# Patient Record
Sex: MALE | HISPANIC OR LATINO | URBAN - METROPOLITAN AREA
[De-identification: names, ages, dates, MRNs, and addresses within clinical notes are randomized per-mention and may not be internally consistent; named-entity substitution may affect disease eponyms.]

---

## 2022-09-21 ENCOUNTER — LAB (OUTPATIENT)
Dept: LAB | Facility: HOSPITAL | Age: 42
End: 2022-09-21

## 2022-09-21 ENCOUNTER — OFFICE VISIT (OUTPATIENT)
Dept: GASTROENTEROLOGY | Facility: CLINIC | Age: 42
End: 2022-09-21

## 2022-09-21 VITALS
OXYGEN SATURATION: 97 % | BODY MASS INDEX: 28.99 KG/M2 | DIASTOLIC BLOOD PRESSURE: 70 MMHG | WEIGHT: 169.8 LBS | HEIGHT: 64 IN | SYSTOLIC BLOOD PRESSURE: 114 MMHG | HEART RATE: 83 BPM | TEMPERATURE: 97.2 F

## 2022-09-21 DIAGNOSIS — R74.01 ELEVATED ALT MEASUREMENT: ICD-10-CM

## 2022-09-21 DIAGNOSIS — R74.01 ELEVATED ALT MEASUREMENT: Primary | ICD-10-CM

## 2022-09-21 DIAGNOSIS — E66.3 OVERWEIGHT (BMI 25.0-29.9): ICD-10-CM

## 2022-09-21 LAB
ALBUMIN SERPL-MCNC: 4.8 G/DL (ref 3.5–5.2)
ALBUMIN/GLOB SERPL: 1.6 G/DL
ALP SERPL-CCNC: 71 U/L (ref 39–117)
ALT SERPL W P-5'-P-CCNC: 84 U/L (ref 1–41)
ANION GAP SERPL CALCULATED.3IONS-SCNC: 10.1 MMOL/L (ref 5–15)
AST SERPL-CCNC: 32 U/L (ref 1–40)
BILIRUB SERPL-MCNC: 0.8 MG/DL (ref 0–1.2)
BUN SERPL-MCNC: 17 MG/DL (ref 6–20)
BUN/CREAT SERPL: 20.5 (ref 7–25)
CALCIUM SPEC-SCNC: 9.5 MG/DL (ref 8.6–10.5)
CERULOPLASMIN SERPL-MCNC: 22 MG/DL (ref 16–31)
CHLORIDE SERPL-SCNC: 105 MMOL/L (ref 98–107)
CO2 SERPL-SCNC: 25.9 MMOL/L (ref 22–29)
CREAT SERPL-MCNC: 0.83 MG/DL (ref 0.76–1.27)
DEPRECATED RDW RBC AUTO: 38.9 FL (ref 37–54)
EGFRCR SERPLBLD CKD-EPI 2021: 112.8 ML/MIN/1.73
ERYTHROCYTE [DISTWIDTH] IN BLOOD BY AUTOMATED COUNT: 12.6 % (ref 12.3–15.4)
FERRITIN SERPL-MCNC: 285 NG/ML (ref 30–400)
GLOBULIN UR ELPH-MCNC: 3 GM/DL
GLUCOSE SERPL-MCNC: 101 MG/DL (ref 65–99)
HBV SURFACE AB SER RIA-ACNC: REACTIVE
HBV SURFACE AG SERPL QL IA: NORMAL
HCT VFR BLD AUTO: 48.4 % (ref 37.5–51)
HGB BLD-MCNC: 17 G/DL (ref 13–17.7)
IGA1 MFR SER: 231 MG/DL (ref 70–400)
IGG1 SER-MCNC: 1241 MG/DL (ref 700–1600)
IGM SERPL-MCNC: 91 MG/DL (ref 40–230)
INR PPP: 1.03 (ref 0.84–1.13)
IRON 24H UR-MRATE: 143 MCG/DL (ref 59–158)
IRON SATN MFR SERPL: 31 % (ref 20–50)
MCH RBC QN AUTO: 29.9 PG (ref 26.6–33)
MCHC RBC AUTO-ENTMCNC: 35.1 G/DL (ref 31.5–35.7)
MCV RBC AUTO: 85.1 FL (ref 79–97)
PLATELET # BLD AUTO: 267 10*3/MM3 (ref 140–450)
PMV BLD AUTO: 10.9 FL (ref 6–12)
POTASSIUM SERPL-SCNC: 4.4 MMOL/L (ref 3.5–5.2)
PROT SERPL-MCNC: 7.8 G/DL (ref 6–8.5)
PROTHROMBIN TIME: 13.4 SECONDS (ref 11.4–14.4)
RBC # BLD AUTO: 5.69 10*6/MM3 (ref 4.14–5.8)
SODIUM SERPL-SCNC: 141 MMOL/L (ref 136–145)
TIBC SERPL-MCNC: 454 MCG/DL (ref 298–536)
TRANSFERRIN SERPL-MCNC: 305 MG/DL (ref 200–360)
WBC NRBC COR # BLD: 6.63 10*3/MM3 (ref 3.4–10.8)

## 2022-09-21 PROCEDURE — 86803 HEPATITIS C AB TEST: CPT

## 2022-09-21 PROCEDURE — 86706 HEP B SURFACE ANTIBODY: CPT

## 2022-09-21 PROCEDURE — 83883 ASSAY NEPHELOMETRY NOT SPEC: CPT

## 2022-09-21 PROCEDURE — 86708 HEPATITIS A ANTIBODY: CPT

## 2022-09-21 PROCEDURE — 80053 COMPREHEN METABOLIC PANEL: CPT

## 2022-09-21 PROCEDURE — 87340 HEPATITIS B SURFACE AG IA: CPT

## 2022-09-21 PROCEDURE — 84478 ASSAY OF TRIGLYCERIDES: CPT

## 2022-09-21 PROCEDURE — 83010 ASSAY OF HAPTOGLOBIN QUANT: CPT

## 2022-09-21 PROCEDURE — 86225 DNA ANTIBODY NATIVE: CPT

## 2022-09-21 PROCEDURE — 82977 ASSAY OF GGT: CPT

## 2022-09-21 PROCEDURE — 86235 NUCLEAR ANTIGEN ANTIBODY: CPT

## 2022-09-21 PROCEDURE — 82172 ASSAY OF APOLIPOPROTEIN: CPT

## 2022-09-21 PROCEDURE — 86364 TISS TRNSGLTMNASE EA IG CLAS: CPT

## 2022-09-21 PROCEDURE — 82103 ALPHA-1-ANTITRYPSIN TOTAL: CPT

## 2022-09-21 PROCEDURE — 84466 ASSAY OF TRANSFERRIN: CPT

## 2022-09-21 PROCEDURE — 86381 MITOCHONDRIAL ANTIBODY EACH: CPT

## 2022-09-21 PROCEDURE — 86376 MICROSOMAL ANTIBODY EACH: CPT

## 2022-09-21 PROCEDURE — 86704 HEP B CORE ANTIBODY TOTAL: CPT

## 2022-09-21 PROCEDURE — 85610 PROTHROMBIN TIME: CPT

## 2022-09-21 PROCEDURE — 86015 ACTIN ANTIBODY EACH: CPT

## 2022-09-21 PROCEDURE — 99204 OFFICE O/P NEW MOD 45 MIN: CPT | Performed by: NURSE PRACTITIONER

## 2022-09-21 PROCEDURE — 82728 ASSAY OF FERRITIN: CPT

## 2022-09-21 PROCEDURE — 82104 ALPHA-1-ANTITRYPSIN PHENO: CPT

## 2022-09-21 PROCEDURE — 86038 ANTINUCLEAR ANTIBODIES: CPT

## 2022-09-21 PROCEDURE — 82465 ASSAY BLD/SERUM CHOLESTEROL: CPT

## 2022-09-21 PROCEDURE — 36415 COLL VENOUS BLD VENIPUNCTURE: CPT

## 2022-09-21 PROCEDURE — 81256 HFE GENE: CPT

## 2022-09-21 PROCEDURE — 83540 ASSAY OF IRON: CPT

## 2022-09-21 PROCEDURE — 82784 ASSAY IGA/IGD/IGG/IGM EACH: CPT

## 2022-09-21 PROCEDURE — 85027 COMPLETE CBC AUTOMATED: CPT

## 2022-09-21 PROCEDURE — 82390 ASSAY OF CERULOPLASMIN: CPT

## 2022-09-21 NOTE — PROGRESS NOTES
GASTROENTEROLOGY OFFICE NOTE    Ruben NATHAN  [id number]  1980    CARE TEAM  Patient Care Team:  Eli Sanchez MD as PCP - General (Family Medicine)    Referring Provider: Eli Sanchez MD    Chief Complaint   Patient presents with   • Elevated Hepatic Enzymes        HISTORY OF PRESENT ILLNESS:   Ruben NATHAN is a 42 y.o. male who presents to the clinic today as a referral from  for evaluation regarding elevated liver enzymes.  He is accompanied by his wife and  Padmini Day used via iPad during office visit.  Review of referral documentation revealed the patient was seen on 8/9/2022 for hair loss that seems consistent with fungal infection for which griseofulvin would be considered if his liver function returned normal.      8/17/2022 CBC with normal white blood cell count, normal hemoglobin, normal hematocrit and normal platelet count.  CMP revealed elevated glucose of 100 and elevated ALT of 76, bilirubin normal 0.6, alkaline phosphatase normal 74 and AST normal 31.  CMP on 1/14/2022 revealed elevated glucose 103, low sodium 134, chloride low 96, calcium low 8.9, AST elevated 96 and ALT elevated at 214. On 1/14/2022 He was evaluated in the emergency department at [facility] and was diagnosed with COVID-19.    No known family history of liver disease.  He does not feel as though he has had COVID again since diagnosed in January.  He reports history of alcohol use, but no alcohol for approximately 10 years.  He previously drank 6-7 beers over the weekend for approximately 2 years.  He takes ibuprofen as needed for headache but not very often and denies use of other over-the-counter medications, herbal remedies.    He is otherwise without complaint today.    History reviewed. No pertinent past medical history.     History reviewed. No pertinent surgical history.     No current outpatient medications on file prior to visit.     No current  "facility-administered medications on file prior to visit.       Allergies   Allergen Reactions   • Ibuprofen Hives   • Penicillin G Hives   • Acetaminophen Unknown (See Comments)       History reviewed. No pertinent family history.    Social History     Socioeconomic History   • Marital status:    Tobacco Use   • Smoking status: Never Smoker   • Smokeless tobacco: Never Used   Vaping Use   • Vaping Use: Never used   Substance and Sexual Activity   • Alcohol use: Not Currently   • Drug use: Never   • Sexual activity: Defer       PHYSICAL EXAM   /70 (BP Location: Left arm, Patient Position: Sitting, Cuff Size: Adult)   Pulse 83   Temp 97.2 °F (36.2 °C) (Infrared)   Ht 161.3 cm (63.5\") Comment: patient unsure, no method of measurement available  Wt 77 kg (169 lb 12.8 oz)   SpO2 97%   BMI 29.61 kg/m²   Physical Exam  Constitutional:       General: He is not in acute distress.     Appearance: He is not toxic-appearing.   HENT:      Head: Normocephalic and atraumatic. No contusion.      Right Ear: External ear normal.      Left Ear: External ear normal.   Eyes:      General: Lids are normal. No scleral icterus.        Right eye: No discharge.         Left eye: No discharge.      Extraocular Movements: Extraocular movements intact.   Neck:      Trachea: Trachea normal.      Comments: No visible mass  No visible adenopathy  Cardiovascular:      Rate and Rhythm: Normal rate.   Pulmonary:      Effort: No respiratory distress.      Comments: Symmetrical expansion    Abdominal:      Palpations: Abdomen is soft. There is no mass.      Tenderness: There is no abdominal tenderness.   Musculoskeletal:      Right lower leg: No edema.      Left lower leg: No edema.      Comments: Symmetrical movement of upper extremities  Symmetrical movement of lower extremities  No visible deformities   Skin:     General: Skin is warm and dry.      Coloration: Skin is not jaundiced.   Neurological:      General: No focal deficit " present.      Mental Status: He is alert and oriented to person, place, and time.   Psychiatric:         Mood and Affect: Mood normal.         Behavior: Behavior normal.         Thought Content: Thought content normal.         Results Review:  8/17/2022 CBC with normal white blood cell count, normal hemoglobin, normal hematocrit and normal platelet count.  CMP revealed elevated glucose of 100 and elevated ALT of 76, bilirubin normal 0.6, alkaline phosphatase normal 74 and AST normal 31.  CMP on 1/14/2022 revealed elevated glucose 103, low sodium 134, chloride low 96, calcium low 8.9, AST elevated 96 and ALT elevated at 214. On 1/14/2022 He was evaluated in the emergency department at [facility] and was diagnosed with COVID-19.    CMP    CMP 1/14/22   Glucose    Glucose 103 (A)   BUN 13   Creatinine 0.92   eGFR Non  Am >60   eGFR African Am >60   Sodium 134 (A)   Potassium 4.2   Chloride 96 (A)   Calcium 8.8 (A)   Albumin 4.3   Total Bilirubin 0.4   Alkaline Phosphatase 98   AST (SGOT) 96 (A)   ALT (SGPT) 214 (A)   Comments are available for some flowsheets but are not being displayed.           CBC    CBC 1/14/22   WBC 4.47   RBC 5.56   Hemoglobin 16.5   Hematocrit 47.5   MCV 85   MCH 29.7   MCHC 34.7   RDW 12.0   Platelets 170          ASSESSMENT / PLAN  1. Elevated ALT measurement  - Elevated ALT could be due to fatty liver disease but will send autoimmune, viral and metabolic work up for further evaluation. I recommend patient try to lose weight via dietary changes and exercise.   US of the liver also ordered for further evaluation.   Also recommend the patient continue to avoid alcohol (denies alcohol use for 10 years)    - Alpha - 1 - Antitrypsin Phenotype; Future  - RUBEN With / DsDNA, RNP, Sjogrens A / B, [place]; Future  - Anti-microsomal Antibody; Future  - Anti-Smooth Muscle Antibody Titer; Future  - CBC (No Diff); Future  - Ceruloplasmin; Future  - Comprehensive Metabolic Panel; Future  -  Ferritin; Future  - HCV Antibody Rfx To Qnt PCR; Future  - Hemochromatosis Mutation; Future  - Hepatitis A Antibody, Total; Future  - US Liver; Future  - Tissue Transglutaminase, IgA; Future  - Protime-INR; Future  - ORNELAS Fibrosure; Future  - Mitochondrial Antibodies, M2; Future  - Iron Profile; Future  - Hepatitis B Surface Antigen; Future  - Hepatitis B Surface Antibody; Future  - Hepatitis B Core Antibody, Total; Future  - IgG, IgA, IgM; Future  There is documentation he received the hepatitis A vaccine on 8/4/2016 and 7/16/2018.  Hepatitis B vaccine on 8/4/2016, 9/8/2016 and 7/16/2018.  Will check for immunity to hepatitis A and B.   2. Overweight (BMI 25.0-29.9)  -Recommend dietary changes and exercise to lose weight as elevated ALT could be due to fatty liver disease    Return in about 4 weeks (around 10/19/2022).    Sandy Xavier, APRN  09/21/2022

## 2022-09-22 LAB
ANA SER QL: NEGATIVE
HAV AB SER QL IA: POSITIVE
HBV CORE AB SERPL QL IA: NEGATIVE
HCV AB S/CO SERPL IA: <0.1 S/CO RATIO (ref 0–0.9)
HCV AB SERPL QL IA: NORMAL
LKM-1 AB SER-ACNC: <1 UNITS (ref 0–20)
MITOCHONDRIA M2 IGG SER-ACNC: <20 UNITS (ref 0–20)
SMA IGG SER-ACNC: 4 UNITS (ref 0–19)
TTG IGA SER-ACNC: <2 U/ML (ref 0–3)

## 2022-09-23 ENCOUNTER — PATIENT ROUNDING (BHMG ONLY) (OUTPATIENT)
Dept: GASTROENTEROLOGY | Facility: CLINIC | Age: 42
End: 2022-09-23

## 2022-09-23 LAB
A1AT PHENOTYP SERPL IFE: NORMAL
A1AT SERPL-MCNC: 130 MG/DL (ref 101–187)
A2 MACROGLOB SERPL-MCNC: 178 MG/DL (ref 110–276)
ALT SERPL W P-5'-P-CCNC: 102 IU/L (ref 0–55)
APO A-I SERPL-MCNC: 119 MG/DL (ref 101–178)
AST SERPL W P-5'-P-CCNC: 33 IU/L (ref 0–40)
BILIRUB SERPL-MCNC: 0.7 MG/DL (ref 0–1.2)
CHOLEST SERPL-MCNC: 179 MG/DL (ref 100–199)
FIBROSIS SCORING:: ABNORMAL
FIBROSIS STAGE SERPL QL: ABNORMAL
GGT SERPL-CCNC: 32 IU/L (ref 0–65)
GLUCOSE SERPL-MCNC: 107 MG/DL (ref 65–99)
HAPTOGLOB SERPL-MCNC: 116 MG/DL (ref 23–355)
INTERPRETATIONS: (REFERENCE): ABNORMAL
LABORATORY COMMENT REPORT: ABNORMAL
LIVER FIBR SCORE SERPL CALC.FIBROSURE: 0.26 (ref 0–0.21)
NASH SCORING (REFERENCE): ABNORMAL
NECROINFLAMMATORY ACT GRADE SERPL QL: ABNORMAL
NECROINFLAMMATORY ACT SCORE SERPL: 0.5
SERVICE CMNT-IMP: ABNORMAL
STEATOSIS GRADE (REFERENCE): ABNORMAL
STEATOSIS GRADING (REFERENCE): ABNORMAL
STEATOSIS SCORE (REFERENCE): 0.69 (ref 0–0.3)
TRIGL SERPL-MCNC: 152 MG/DL (ref 0–149)

## 2022-09-26 LAB — HFE GENE MUT ANL BLD/T: NORMAL

## 2022-10-11 ENCOUNTER — HOSPITAL ENCOUNTER (OUTPATIENT)
Dept: ULTRASOUND IMAGING | Facility: HOSPITAL | Age: 42
Discharge: HOME | End: 2022-10-11
Admitting: NURSE PRACTITIONER

## 2022-10-11 DIAGNOSIS — R74.01 ELEVATED ALT MEASUREMENT: ICD-10-CM

## 2022-10-11 PROCEDURE — 76705 ECHO EXAM OF ABDOMEN: CPT

## 2022-10-13 NOTE — PROGRESS NOTES
Please use  to let him know the liver does appear abnormal on imaging with findings suggestive of fatty change but also with coarse appearance of liver that may be concerning for chronic liver disease. Recommend dietary changes and exercise to try to lose weight. Avoid alcohol.  We will plan for follow up as scheduled, we may need to consider liver biopsy in the future but will discuss at follow up visit.   Thanks!

## 2022-10-21 ENCOUNTER — PREP FOR SURGERY (OUTPATIENT)
Dept: OTHER | Facility: HOSPITAL | Age: 42
End: 2022-10-21

## 2022-10-21 ENCOUNTER — OFFICE VISIT (OUTPATIENT)
Dept: GASTROENTEROLOGY | Facility: CLINIC | Age: 42
End: 2022-10-21

## 2022-10-21 VITALS
HEIGHT: 64 IN | OXYGEN SATURATION: 97 % | SYSTOLIC BLOOD PRESSURE: 116 MMHG | BODY MASS INDEX: 29.53 KG/M2 | TEMPERATURE: 97.5 F | WEIGHT: 173 LBS | DIASTOLIC BLOOD PRESSURE: 72 MMHG | HEART RATE: 59 BPM

## 2022-10-21 DIAGNOSIS — R93.2 ABNORMAL LIVER ULTRASOUND: ICD-10-CM

## 2022-10-21 DIAGNOSIS — R10.9 RIGHT SIDED ABDOMINAL PAIN: ICD-10-CM

## 2022-10-21 DIAGNOSIS — R74.01 ELEVATED ALT MEASUREMENT: Primary | ICD-10-CM

## 2022-10-21 DIAGNOSIS — E66.9 CLASS 1 OBESITY WITH SERIOUS COMORBIDITY AND BODY MASS INDEX (BMI) OF 30.0 TO 30.9 IN ADULT, UNSPECIFIED OBESITY TYPE: ICD-10-CM

## 2022-10-21 PROCEDURE — 99213 OFFICE O/P EST LOW 20 MIN: CPT | Performed by: NURSE PRACTITIONER

## 2022-10-21 NOTE — PROGRESS NOTES
"GASTROENTEROLOGY OFFICE NOTE    Ruben Kenny  [id number]  1980    CARE TEAM  Patient Care Team:  Eli Sanchez MD as PCP - General (Family Medicine)    Referring Provider: No ref. provider found    Chief Complaint   Patient presents with   • Elevated Hepatic Enzymes     1 month follow up        HISTORY OF PRESENT ILLNESS:   Ruben Kenny is a 42 y.o. male who Returns for follow-up regarding elevated ALT. He is accompanied by his wife who ask some questions.      Frazr used via iPad during the appointment today    He reports occasional right side abdominal pain, mentions it could be due to hunger    His weight on 9/21/2022 was 169 pounds.  Today, his weight is 173 pounds.    10/11/2022 ultrasound of the liver revealed coarsened echotexture, increased liver echogenicity which can indicate steatosis or other hepatocellular disease.    Prior lab work-up for viral, autoimmune and metabolic causes of elevated ALT,  suspect ORNELAS.  It has been recommended patient work on weight loss.    History reviewed. No pertinent past medical history.     History reviewed. No pertinent surgical history.     No current outpatient medications on file prior to visit.     No current facility-administered medications on file prior to visit.       Allergies   Allergen Reactions   • Ibuprofen Hives   • Penicillin G Hives   • Acetaminophen Unknown (See Comments)       History reviewed. No pertinent family history.    Social History     Socioeconomic History   • Marital status:    Tobacco Use   • Smoking status: Never   • Smokeless tobacco: Never   Vaping Use   • Vaping Use: Never used   Substance and Sexual Activity   • Alcohol use: Not Currently   • Drug use: Never   • Sexual activity: Defer       PHYSICAL EXAM   /72 (BP Location: Left arm, Patient Position: Sitting, Cuff Size: Adult)   Pulse 59   Temp 97.5 °F (36.4 °C) (Infrared)   Ht 161.3 cm (63.5\")   Wt 78.5 kg (173 " lb)   SpO2 97%   BMI 30.16 kg/m²   Physical Exam  Constitutional:       General: He is not in acute distress.     Appearance: He is not toxic-appearing.   HENT:      Head: Normocephalic and atraumatic. No contusion.      Right Ear: External ear normal.      Left Ear: External ear normal.   Eyes:      General: Lids are normal. No scleral icterus.        Right eye: No discharge.         Left eye: No discharge.      Extraocular Movements: Extraocular movements intact.   Neck:      Trachea: Trachea normal.      Comments: No visible mass  No visible adenopathy  Cardiovascular:      Rate and Rhythm: Bradycardia present.   Pulmonary:      Effort: No respiratory distress.      Comments: Symmetrical expansion    Abdominal:      Palpations: Abdomen is soft. There is no mass.      Tenderness: There is no abdominal tenderness.   Musculoskeletal:      Right lower leg: No edema.      Left lower leg: No edema.      Comments: Symmetrical movement of upper extremities  Symmetrical movement of lower extremities  No visible deformities   Skin:     General: Skin is warm and dry.      Coloration: Skin is not jaundiced.   Neurological:      General: No focal deficit present.      Mental Status: He is alert and oriented to person, place, and time.   Psychiatric:         Mood and Affect: Mood normal.         Behavior: Behavior normal.         Thought Content: Thought content normal.         Results Review:  CMP on 1/14/2022 revealed elevated glucose 103, low sodium 134, chloride low 96, calcium low 8.9, AST elevated 96 and ALT elevated at 214. On 1/14/2022 He was evaluated in the emergency department at [facility] and was diagnosed with COVID-19.      8/17/2022 CBC with normal white blood cell count, normal hemoglobin, normal hematocrit and normal platelet count.  CMP revealed elevated glucose of 100 and elevated ALT of 76, bilirubin normal 0.6, alkaline phosphatase normal 74 and AST normal 31.        9/21/2022  Alpha - 1 -  Antitrypsin Phenotype 130, MM  - RUBEN With / DsDNA, RNP, Sjogrens negative  - Anti-microsomal Antibody negative  - Anti-Smooth Muscle Antibody Titer negative  - Ceruloplasmin negative  - Ferritin 285  - HCV Antibody Rfx To Qnt PCR negative  - Hemochromatosis Mutation not detected  - Hepatitis A Antibody, Total positive indicating immunity  - Tissue Transglutaminase, IgA <2  - Protime-INR 1.03  - ORNELAS Fibrosure F0-1, S3, N1  - Mitochondrial Antibodies, M2 negative  - Iron Profile normal  - Hepatitis B Surface Antigen non-reactive  - Hepatitis B Surface Antibody reactive indicating immunity  - Hepatitis B Core Antibody negative  - IgG 1241, IgA 231, IgM 91    10/11/2022 ultrasound of the liver revealed coarsened echotexture, increased liver echogenicity which can indicate steatosis or other hepatocellular disease.    CMP    CMP 1/14/22 9/21/22   Glucose  101 (A)   Glucose 103 (A)    BUN 13 17   Creatinine 0.92 0.83   eGFR Non  Am >60    eGFR African Am >60    Sodium 134 (A) 141   Potassium 4.2 4.4   Chloride 96 (A) 105   Calcium 8.8 (A) 9.5   Albumin 4.3 4.80   Total Bilirubin 0.4 0.8   Alkaline Phosphatase 98 71   AST (SGOT) 96 (A) 32   ALT (SGPT) 214 (A) 84 (A)   (A) Abnormal value       Comments are available for some flowsheets but are not being displayed.           CBC    CBC 1/14/22 9/21/22   WBC 4.47 6.63   RBC 5.56 5.69   Hemoglobin 16.5 17.0   Hematocrit 47.5 48.4   MCV 85 85.1   MCH 29.7 29.9   MCHC 34.7 35.1   RDW 12.0 12.6   Platelets 170 267           INR    Common Labsle 9/21/22   INR 1.03           ASSESSMENT / PLAN  1. Elevated ALT measurement  -Based off viral, autoimmune and metabolic work-up for elevated ALT, suspect likely due to ORNELAS but due to coarse appearance of liver on ultrasound, recommend liver biopsy in attempt to identify reason for elevated ALT as well as accurate staging of liver tissue,   - we discussed risks of injury, infection, perforation, bleeding associated with liver  biopsy  - labs as below at time of liver biopsy if needed in radiology  -Recommend dietary changes and exercise to lose weight, weight loss goal of at least 17 pounds suggested (10% of total body weight)  - CBC (No Diff); Future  - Protime-INR; Future  - Comprehensive Metabolic Panel; Future    2. Class 1 obesity with serious comorbidity and body mass index (BMI) of 30.0 to 30.9 in adult, unspecified obesity type  -Recommend dietary changes and exercise to lose weight    3. Right sided abdominal pain  -Recommend patient monitor at this time, today he mentioned pain may be due to hunger.  Recommend he let the office know if pain becomes worse.    4. Abnormal liver ultrasound  - 10/11/2022 ultrasound of the liver revealed coarsened echotexture, increased liver echogenicity which can indicate steatosis or other hepatocellular disease.  - suspect ORNELAS contributing based off lab work up but plan for liver biopsy for additional evaluation    - CBC (No Diff); Future  - Protime-INR; Future  - Comprehensive Metabolic Panel; Future      No follow-ups on file.    Sandy Xavier, APRN  10/21/2022

## 2022-11-16 ENCOUNTER — APPOINTMENT (OUTPATIENT)
Dept: ULTRASOUND IMAGING | Facility: HOSPITAL | Age: 42
End: 2022-11-16